# Patient Record
Sex: MALE | URBAN - METROPOLITAN AREA
[De-identification: names, ages, dates, MRNs, and addresses within clinical notes are randomized per-mention and may not be internally consistent; named-entity substitution may affect disease eponyms.]

---

## 2019-01-15 ENCOUNTER — EMERGENCY (EMERGENCY)
Facility: HOSPITAL | Age: 54
LOS: 1 days | Discharge: HOME | End: 2019-01-15
Attending: EMERGENCY MEDICINE | Admitting: EMERGENCY MEDICINE
Payer: SELF-PAY

## 2019-01-15 VITALS
SYSTOLIC BLOOD PRESSURE: 113 MMHG | DIASTOLIC BLOOD PRESSURE: 69 MMHG | OXYGEN SATURATION: 95 % | TEMPERATURE: 97 F | RESPIRATION RATE: 17 BRPM | HEART RATE: 86 BPM

## 2019-01-15 DIAGNOSIS — F10.129 ALCOHOL ABUSE WITH INTOXICATION, UNSPECIFIED: ICD-10-CM

## 2019-01-15 DIAGNOSIS — R41.82 ALTERED MENTAL STATUS, UNSPECIFIED: ICD-10-CM

## 2019-01-15 PROCEDURE — 99284 EMERGENCY DEPT VISIT MOD MDM: CPT

## 2019-01-15 NOTE — ED ADULT NURSE NOTE - NSIMPLEMENTINTERV_GEN_ALL_ED
Implemented All Fall with Harm Risk Interventions:  [place] to call system. Call bell, personal items and telephone within reach. Instruct patient to call for assistance. Room bathroom lighting operational. Non-slip footwear when patient is off stretcher. Physically safe environment: no spills, clutter or unnecessary equipment. Stretcher in lowest position, wheels locked, appropriate side rails in place. Provide visual cue, wrist band, yellow gown, etc. Monitor gait and stability. Monitor for mental status changes and reorient to person, place, and time. Review medications for side effects contributing to fall risk. Reinforce activity limits and safety measures with patient and family. Provide visual clues: red socks.

## 2019-01-15 NOTE — ED ADULT NURSE REASSESSMENT NOTE - NS ED NURSE REASSESS COMMENT FT1
Pt ambulated with steady gait. Pt cleared to be discharged. Pt states that he does not want to go to a shelter. Pt to stay in lobby for code blue. [facility] notified of pt.

## 2019-01-15 NOTE — ED PROVIDER NOTE - PROGRESS NOTE DETAILS
NICO Rooney received patient at shift change pending sobriety The patient is now awake and alert, clinically sober.  Able to walk a straight line.  Repeat exam and neuro/cranial nerve exams normal.  No evidence of head/neck trauma.  Patient denies any pain or other complaints.  Denies cp/sob/ha/abd pain.  Abd soft, lungs clear, heart exam normal.  [place] po challenge.  Patient says only used alcohol no other substances.  Denies any assault.  Feels much better and pt feels safe for discharge.  No evidence of intoxication at this time or alcohol withdrawal.  No other complaints on discharge.

## 2019-01-15 NOTE — ED ADULT TRIAGE NOTE - CHIEF COMPLAINT QUOTE
pt bibems for sleeping on sidewalk, narcan IN given, pt only admits to etoh, arrives awake and alert, calm

## 2019-10-21 NOTE — ED ADULT NURSE NOTE - NSSISCREENINGQ1_ED_A_ED
1:14 AM  10/21/19     Discharge instructions given to patient (name) with verbalization of understanding. Patient accompanied by family. Patient discharged with the following prescriptions Clindamycin, Percocet, Ibuprofen. Patient discharged to home (destination).       Patrice Ceron RN No

## 2021-10-15 NOTE — ED ADULT NURSE NOTE - NSINTERVENTIONOPT_GEN_ALL_ED
[FreeTextEntry1] : This is a 74-year-old male who has several ongoing problems\par \par He has shortness of breath which he experiences at rest but less so on exertion he has been seen by pulmonary.\par He did have pulmonary function tests which are reviewed he has mild obstructive disease mildly reduced diffusion and respiratory muscle force is reported as low compared with normal for age. I did discuss that I did not think it was reasonable for him to have a breath at rest while not on exertion. This certainly could be anxiety and I did suggest possibly use of an antianxiety medication at least for trial. In addition he will be going for a consultation with a pulmonologist for neuromuscular disease\par \par He remains anemic with a hemoglobin of 12.3 he did see GI and he states that he will go for his gastroscopy colonoscopy and camera study if necessary\par \par He does have nocturia and frequency and in addition he had a mildly elevated PSA he has been switched to Rapaflo which has given him some relief he will follow-up with his urologist\par \par He did see dermatology who put him on ketoconazole for his tinea cruris versicolor he has not begun this as yet\par \par He does have a history of ASHD he has had no chest pain his EKG is unchanged\par \par He remains on atorvastatin his LDL is at goal at 51\par \par He did have a cortisol which was low at 5.5 this was not a.m. he will repeat this.\par \par Did have an abnormal CT and is scheduled to repeat next month\par \par Concerned about the fact that he has lost height he has osteopenia and a low testosterone at this point I think he should see a another endocrinologist for a more cogent opinion on medication\par \par He remains on Dostinex for his pituitary adenoma prolactin levels are suppressed. 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 He has had no chest pain and EKG was obtained and was normal\par \par He has received his Covid vaccinations and will get a flu at his institution Enhanced Supervision/Move Toilet (commode/urinal) to patient using "arms reach"/[place] Rounding